# Patient Record
Sex: FEMALE | Race: WHITE | NOT HISPANIC OR LATINO | Employment: PART TIME | URBAN - METROPOLITAN AREA
[De-identification: names, ages, dates, MRNs, and addresses within clinical notes are randomized per-mention and may not be internally consistent; named-entity substitution may affect disease eponyms.]

---

## 2018-05-03 ENCOUNTER — OFFICE VISIT (OUTPATIENT)
Dept: URGENT CARE | Facility: CLINIC | Age: 53
End: 2018-05-03
Payer: COMMERCIAL

## 2018-05-03 VITALS
SYSTOLIC BLOOD PRESSURE: 140 MMHG | WEIGHT: 170 LBS | HEIGHT: 66 IN | TEMPERATURE: 98 F | OXYGEN SATURATION: 98 % | DIASTOLIC BLOOD PRESSURE: 86 MMHG | BODY MASS INDEX: 27.32 KG/M2 | HEART RATE: 92 BPM | RESPIRATION RATE: 16 BRPM

## 2018-05-03 DIAGNOSIS — L57.8 DERMATITIS DUE TO SUN: Primary | ICD-10-CM

## 2018-05-03 PROCEDURE — 99203 OFFICE O/P NEW LOW 30 MIN: CPT | Mod: SA,S$GLB,, | Performed by: NURSE PRACTITIONER

## 2018-05-03 RX ORDER — AMLODIPINE BESYLATE 5 MG/1
5 TABLET ORAL DAILY
COMMUNITY

## 2018-05-03 RX ORDER — TRIAMCINOLONE ACETONIDE 0.25 MG/G
CREAM TOPICAL 2 TIMES DAILY
Qty: 15 G | Refills: 1 | Status: SHIPPED | OUTPATIENT
Start: 2018-05-03 | End: 2018-05-05 | Stop reason: SDUPTHER

## 2018-05-03 NOTE — PROGRESS NOTES
"Subjective:       Patient ID: Ava Watts is a 53 y.o. female.    Vitals:  height is 5' 6" (1.676 m) and weight is 77.1 kg (170 lb). Her temperature is 98.4 °F (36.9 °C). Her blood pressure is 140/86 (abnormal) and her pulse is 92. Her respiration is 16 and oxygen saturation is 98%.     Chief Complaint: Rash    Pt is c/o having a rash on both lower legs. Pt think she have a heat rash. Pt says the rash is tight, itchy and red. Pt tried using hydrocortisone cream but no improvement. Pt says when this happen to her awhile ago she was given steroid cream. Pt says the rash is spreading to the back of her shin. Pt is visiting from [place].       Rash   This is a new problem. The current episode started yesterday. The problem has been gradually worsening since onset. The affected locations include the left lower leg and right lower leg. The rash is characterized by itchiness and burning. She was exposed to nothing. Pertinent negatives include no fever, joint pain, shortness of breath or sore throat. Past treatments include anti-itch cream. The treatment provided mild relief.     Review of Systems   Constitution: Negative for chills and fever.   HENT: Negative for sore throat.    Respiratory: Negative for shortness of breath.    Skin: Positive for itching and rash.   Musculoskeletal: Negative for joint pain.       Objective:      Physical Exam   Constitutional: She is oriented to person, place, and time. Vital signs are normal. She appears well-developed and well-nourished.   HENT:   Head: Normocephalic and atraumatic. Head is without abrasion, without contusion and without laceration.   Right Ear: External ear normal.   Left Ear: External ear normal.   Nose: Nose normal.   Mouth/Throat: Oropharynx is clear and moist.   Eyes: Conjunctivae, EOM and lids are normal. Pupils are equal, round, and reactive to light.   Neck: Trachea normal, normal range of motion, full passive range of motion without pain and phonation " normal. Neck supple.   Cardiovascular: Normal rate, regular rhythm, S1 normal, S2 normal and normal heart sounds.    Pulmonary/Chest: Effort normal and breath sounds normal. No stridor. No respiratory distress.   Musculoskeletal: Normal range of motion.   Neurological: She is alert and oriented to person, place, and time.   Skin: Skin is warm, dry and intact. Capillary refill takes less than 2 seconds. Lesion and rash noted. No abrasion, no bruising, no burn, no ecchymosis and no laceration noted. Rash is macular. There is erythema.        Bilateral erythematous macular rash noted to the lower extremities.  See picture    Psychiatric: She has a normal mood and affect. Her speech is normal and behavior is normal. Judgment and thought content normal. Cognition and memory are normal.   Nursing note and vitals reviewed.                Assessment:       1. Dermatitis due to sun        Plan:         Dermatitis due to sun  -     triamcinolone acetonide 0.025% (KENALOG) 0.025 % cream; Apply topically 2 (two) times daily.  Dispense: 15 g; Refill: 1      Patient Instructions   Please return here or go to the Emergency Department for any concerns or worsening of condition.   If you were prescribed a narcotic medication, do not drive or operate heavy equipment or machinery while taking these medications.   Apply steroid cream as prescribed.  If you have a localized reaction it is ok to take over the counter Benadryl as directed for the next 24-72 hours as needed for itching unless it makes you sleepy, then you can take over the counter Allegra or Claritin or Zyrtec as directed during the daytime hours as these generally do not cause drowsiness.

## 2018-05-03 NOTE — PATIENT INSTRUCTIONS
Please return here or go to the Emergency Department for any concerns or worsening of condition.   If you were prescribed a narcotic medication, do not drive or operate heavy equipment or machinery while taking these medications.   Apply steroid cream as prescribed.  If you have a localized reaction it is ok to take over the counter Benadryl as directed for the next 24-72 hours as needed for itching unless it makes you sleepy, then you can take over the counter Allegra or Claritin or Zyrtec as directed during the daytime hours as these generally do not cause drowsiness.     Nonspecific Dermatitis  Dermatitis is a skin rash caused by something that touches the skin and makes it irritated and inflamed.  Your skin may be red, swollen, dry, and may be cracked. Blisters may form and ooze. The rash will itch.  Dermatitis can form on the face and neck, backs of hands, forearms, genitals, and lower legs. Dermatitis is not passed from person to person.  Talk with your health care provider about what may have caused the rash. Common things that cause skin allergies are metal in jewelry, plants like poison ivy or poison oak, and certain skin care products. You will need to avoid the source of your rash in the future to prevent it from coming back. In some cases, the cause of the dermatitis may not be found.  Treatment is done to relieve itching and prevent the rash from coming back. The rash should go away in a few days to a few weeks.  Home care  The health care provider may prescribe medications to relieve swelling and itching. Follow all instructions when using these medications.  · Avoid anything that heats up your skin, such as hot showers or baths, or direct sunlight. This can make itching worse.  · Stay away from whatever you think caused the rash.  · Bathe in warm, not hot, water. Apply a moisturizing lotion after bathing to prevent dry skin.  · Avoid skin irritants such as wool or silk clothing, grease, oils, harsh  soaps, and detergents.  · Apply cold compresses to soothe your sores to help relieve your symptoms. Do this for 30 minutes 3 to 4 times a day. You can make a cold compress by soaking a cloth in cold water. Squeeze out excess water. You can add colloidal oatmeal to the water to help reduce itching. For severe itching in a small area, apply an ice pack wrapped in a thin towel. Do this for 20 minutes 3 to 4 times a day.  · You can also help relieve large areas of itching by taking a lukewarm bath with colloidal oatmeal added to the water.  · Use hydrocortisone cream for redness and irritation, unless another medicine was prescribed. You can also use benzocaine anesthetic cream or spray.  · Use oral diphenhydramine to help reduce itching. This is an antihistamine you can buy at drug and grocery stores. It can make you sleepy, so use lower doses during the daytime. Or you can use loratadine. This is an antihistamine that will not make you sleepy. Dont use diphenhydramine if you have glaucoma or have trouble urinating because of an enlarged prostate.  · Wash your hands or use an antibacterial gel often to prevent the spread of the rash.  Follow-up care  Follow up with your health care provider. Make an appointment with your health care provider if your symptoms do not get better in the next 1 to 2 weeks.  When to seek medical advice  Call your health care provider right away if any of these occur:  · Spreading of the rash to other parts of your body  · Severe swelling of your face, eyelids, mouth, throat or tongue  · Trouble urinating due to swelling in the genital area  · Fever of 100.4°F (38°C) or higher  · Redness or swelling that gets worse  · Pain that gets worse  · Foul-smelling fluid leaking from the skin  · Yellow-brown crusts on the open blisters  · Joint pain   Date Last Reviewed: 7/23/2014  © 4084-4115 [employer]. [address] [place] [address], [place] [address]. All rights reserved. This information  is not intended as a substitute for professional medical care. Always follow your healthcare professional's instructions.

## 2018-05-05 ENCOUNTER — TELEPHONE (OUTPATIENT)
Dept: URGENT CARE | Facility: CLINIC | Age: 53
End: 2018-05-05

## 2018-05-05 RX ORDER — TRIAMCINOLONE ACETONIDE 0.25 MG/G
CREAM TOPICAL 2 TIMES DAILY
Qty: 15 G | Refills: 1 | Status: SHIPPED | OUTPATIENT
Start: 2018-05-05

## 2018-05-05 NOTE — PROGRESS NOTES
Patient reports to the clinic stating the pharmacy did not have her steroid cream at the pharmacy.  Requested a printed prescription.  Prescription reprinted now and given to the patient.